# Patient Record
Sex: MALE | Race: WHITE | URBAN - METROPOLITAN AREA
[De-identification: names, ages, dates, MRNs, and addresses within clinical notes are randomized per-mention and may not be internally consistent; named-entity substitution may affect disease eponyms.]

---

## 2022-11-14 ENCOUNTER — OFFICE VISIT (OUTPATIENT)
Dept: INTERNAL MEDICINE CLINIC | Facility: CLINIC | Age: 77
End: 2022-11-14
Payer: MEDICARE

## 2022-11-14 VITALS
BODY MASS INDEX: 24.13 KG/M2 | WEIGHT: 188 LBS | HEIGHT: 74 IN | HEART RATE: 75 BPM | DIASTOLIC BLOOD PRESSURE: 82 MMHG | SYSTOLIC BLOOD PRESSURE: 130 MMHG | RESPIRATION RATE: 16 BRPM | TEMPERATURE: 98 F | OXYGEN SATURATION: 98 %

## 2022-11-14 DIAGNOSIS — Z12.11 COLON CANCER SCREENING: ICD-10-CM

## 2022-11-14 DIAGNOSIS — Z86.73 HISTORY OF STROKE: ICD-10-CM

## 2022-11-14 DIAGNOSIS — Z00.00 ENCOUNTER FOR ANNUAL HEALTH EXAMINATION: ICD-10-CM

## 2022-11-14 DIAGNOSIS — I25.119 ATHEROSCLEROSIS OF NATIVE CORONARY ARTERY OF NATIVE HEART WITH ANGINA PECTORIS (HCC): ICD-10-CM

## 2022-11-14 DIAGNOSIS — Z28.21 INFLUENZA VACCINATION DECLINED BY PATIENT: ICD-10-CM

## 2022-11-14 DIAGNOSIS — Z28.21 COVID-19 VACCINE SERIES DECLINED: ICD-10-CM

## 2022-11-14 DIAGNOSIS — E78.2 MIXED HYPERLIPIDEMIA: ICD-10-CM

## 2022-11-14 DIAGNOSIS — Z12.5 PROSTATE CANCER SCREENING: ICD-10-CM

## 2022-11-14 DIAGNOSIS — Z00.00 ANNUAL PHYSICAL EXAM: Primary | ICD-10-CM

## 2022-11-14 DIAGNOSIS — Z28.310 COVID-19 VACCINE SERIES DECLINED: ICD-10-CM

## 2022-11-14 DIAGNOSIS — I10 ESSENTIAL HYPERTENSION: ICD-10-CM

## 2022-11-14 DIAGNOSIS — Q21.12 PATENT FORAMEN OVALE: ICD-10-CM

## 2022-11-14 DIAGNOSIS — Z28.21 PNEUMOCOCCAL VACCINATION DECLINED BY PATIENT: ICD-10-CM

## 2022-11-14 PROBLEM — Q21.10 ATRIAL SEPTAL DEFECT: Status: RESOLVED | Noted: 2022-11-14 | Resolved: 2022-11-14

## 2022-11-14 PROBLEM — Q21.10 ATRIAL SEPTAL DEFECT: Status: ACTIVE | Noted: 2022-11-14

## 2022-11-14 PROBLEM — Q21.10 ATRIAL SEPTAL DEFECT (HCC): Status: RESOLVED | Noted: 2022-11-14 | Resolved: 2022-11-14

## 2022-11-14 PROBLEM — Q21.10 ATRIAL SEPTAL DEFECT (HCC): Status: ACTIVE | Noted: 2022-11-14

## 2022-11-14 RX ORDER — SACUBITRIL AND VALSARTAN 24; 26 MG/1; MG/1
1 TABLET, FILM COATED ORAL 2 TIMES DAILY
COMMUNITY
Start: 2022-11-03

## 2022-12-20 ENCOUNTER — LABORATORY ENCOUNTER (OUTPATIENT)
Dept: LAB | Age: 77
End: 2022-12-20
Attending: INTERNAL MEDICINE
Payer: MEDICARE

## 2022-12-20 DIAGNOSIS — E78.00 PURE HYPERCHOLESTEROLEMIA: ICD-10-CM

## 2022-12-20 DIAGNOSIS — Z12.5 PROSTATE CANCER SCREENING: ICD-10-CM

## 2022-12-20 DIAGNOSIS — I10 ESSENTIAL HYPERTENSION: ICD-10-CM

## 2022-12-20 DIAGNOSIS — E78.2 MIXED HYPERLIPIDEMIA: ICD-10-CM

## 2022-12-20 DIAGNOSIS — I25.10 CORONARY ARTERY DISEASE INVOLVING NATIVE HEART, UNSPECIFIED VESSEL OR LESION TYPE, UNSPECIFIED WHETHER ANGINA PRESENT: ICD-10-CM

## 2022-12-20 DIAGNOSIS — I25.5 ISCHEMIC CARDIOMYOPATHY: ICD-10-CM

## 2022-12-20 LAB
ALBUMIN SERPL-MCNC: 3.8 G/DL (ref 3.4–5)
ALBUMIN/GLOB SERPL: 1.2 {RATIO} (ref 1–2)
ALP LIVER SERPL-CCNC: 53 U/L
ALT SERPL-CCNC: 20 U/L
ANION GAP SERPL CALC-SCNC: 4 MMOL/L (ref 0–18)
AST SERPL-CCNC: 20 U/L (ref 15–37)
BASOPHILS # BLD AUTO: 0.07 X10(3) UL (ref 0–0.2)
BASOPHILS NFR BLD AUTO: 1.4 %
BILIRUB SERPL-MCNC: 0.7 MG/DL (ref 0.1–2)
BILIRUB UR QL STRIP.AUTO: NEGATIVE
BUN BLD-MCNC: 22 MG/DL (ref 7–18)
CALCIUM BLD-MCNC: 9.5 MG/DL (ref 8.5–10.1)
CHLORIDE SERPL-SCNC: 111 MMOL/L (ref 98–112)
CHOLEST SERPL-MCNC: 128 MG/DL (ref ?–200)
CLARITY UR REFRACT.AUTO: CLEAR
CO2 SERPL-SCNC: 29 MMOL/L (ref 21–32)
COLOR UR AUTO: YELLOW
COMPLEXED PSA SERPL-MCNC: 2.14 NG/ML (ref ?–4)
CREAT BLD-MCNC: 1.05 MG/DL
EOSINOPHIL # BLD AUTO: 0.17 X10(3) UL (ref 0–0.7)
EOSINOPHIL NFR BLD AUTO: 3.5 %
ERYTHROCYTE [DISTWIDTH] IN BLOOD BY AUTOMATED COUNT: 12.7 %
FASTING PATIENT LIPID ANSWER: YES
FASTING STATUS PATIENT QL REPORTED: YES
GFR SERPLBLD BASED ON 1.73 SQ M-ARVRAT: 72 ML/MIN/1.73M2 (ref 60–?)
GLOBULIN PLAS-MCNC: 3.2 G/DL (ref 2.8–4.4)
GLUCOSE BLD-MCNC: 97 MG/DL (ref 70–99)
GLUCOSE UR STRIP.AUTO-MCNC: NEGATIVE MG/DL
HCT VFR BLD AUTO: 44.8 %
HDLC SERPL-MCNC: 48 MG/DL (ref 40–59)
HGB BLD-MCNC: 14.4 G/DL
IMM GRANULOCYTES # BLD AUTO: 0.01 X10(3) UL (ref 0–1)
IMM GRANULOCYTES NFR BLD: 0.2 %
KETONES UR STRIP.AUTO-MCNC: NEGATIVE MG/DL
LDLC SERPL CALC-MCNC: 68 MG/DL (ref ?–100)
LEUKOCYTE ESTERASE UR QL STRIP.AUTO: NEGATIVE
LYMPHOCYTES # BLD AUTO: 1.03 X10(3) UL (ref 1–4)
LYMPHOCYTES NFR BLD AUTO: 21.3 %
MCH RBC QN AUTO: 31.6 PG (ref 26–34)
MCHC RBC AUTO-ENTMCNC: 32.1 G/DL (ref 31–37)
MCV RBC AUTO: 98.5 FL
MONOCYTES # BLD AUTO: 0.52 X10(3) UL (ref 0.1–1)
MONOCYTES NFR BLD AUTO: 10.8 %
NEUTROPHILS # BLD AUTO: 3.03 X10 (3) UL (ref 1.5–7.7)
NEUTROPHILS # BLD AUTO: 3.03 X10(3) UL (ref 1.5–7.7)
NEUTROPHILS NFR BLD AUTO: 62.8 %
NITRITE UR QL STRIP.AUTO: NEGATIVE
NONHDLC SERPL-MCNC: 80 MG/DL (ref ?–130)
NT-PROBNP SERPL-MCNC: 451 PG/ML (ref ?–450)
OSMOLALITY SERPL CALC.SUM OF ELEC: 301 MOSM/KG (ref 275–295)
PH UR STRIP.AUTO: 6.5 [PH] (ref 5–8)
PLATELET # BLD AUTO: 154 10(3)UL (ref 150–450)
POTASSIUM SERPL-SCNC: 5 MMOL/L (ref 3.5–5.1)
PROT SERPL-MCNC: 7 G/DL (ref 6.4–8.2)
PROT UR STRIP.AUTO-MCNC: NEGATIVE MG/DL
RBC # BLD AUTO: 4.55 X10(6)UL
RBC UR QL AUTO: NEGATIVE
SODIUM SERPL-SCNC: 144 MMOL/L (ref 136–145)
SP GR UR STRIP.AUTO: 1.02 (ref 1–1.03)
TRIGL SERPL-MCNC: 54 MG/DL (ref 30–149)
TSI SER-ACNC: 2.64 MIU/ML (ref 0.36–3.74)
UROBILINOGEN UR STRIP.AUTO-MCNC: 0.2 MG/DL
VLDLC SERPL CALC-MCNC: 8 MG/DL (ref 0–30)
WBC # BLD AUTO: 4.8 X10(3) UL (ref 4–11)

## 2022-12-20 PROCEDURE — 80061 LIPID PANEL: CPT

## 2022-12-20 PROCEDURE — 81003 URINALYSIS AUTO W/O SCOPE: CPT

## 2022-12-20 PROCEDURE — 80053 COMPREHEN METABOLIC PANEL: CPT

## 2022-12-20 PROCEDURE — 36415 COLL VENOUS BLD VENIPUNCTURE: CPT

## 2022-12-20 PROCEDURE — 84443 ASSAY THYROID STIM HORMONE: CPT

## 2022-12-20 PROCEDURE — 85025 COMPLETE CBC W/AUTO DIFF WBC: CPT

## 2022-12-20 PROCEDURE — 83880 ASSAY OF NATRIURETIC PEPTIDE: CPT

## 2022-12-28 ENCOUNTER — TELEPHONE (OUTPATIENT)
Dept: INTERNAL MEDICINE CLINIC | Facility: CLINIC | Age: 77
End: 2022-12-28

## 2023-01-04 ENCOUNTER — TELEPHONE (OUTPATIENT)
Dept: INTERNAL MEDICINE CLINIC | Facility: CLINIC | Age: 78
End: 2023-01-04

## 2023-01-04 NOTE — TELEPHONE ENCOUNTER
Patient called as he has a finger on one hand that has swollen to twice its size, is warm and is growing on angle with redness. Patient has arthritis and also had a shoulder replaced 6 years ago. Patient is wondering if this is an issue with his arthritis or if he needs to be seen by Dr. Bry Quiros. Patient would like to discuss situation with a nurse.      Please call back patient at:    [phone]

## 2023-01-04 NOTE — TELEPHONE ENCOUNTER
Spoke with pt, stated having swelling of his middle finger on right hand for few weeks, started with turning 20 degrees at knuckle and then changing color and now swelling  Advised to send pic if possible and if needed he may have to come in for evaluation  Pt v/u  Awaiting for images

## 2023-01-16 NOTE — TELEPHONE ENCOUNTER
Follow up call completed with patient. Seen at urgent care on Jan 4th, x-ray completed and patient prescribed seven day course of abx treatment. Patient has follow up appointment with orthopedic hand specialist on 23rd.

## 2023-02-09 ENCOUNTER — MED REC SCAN ONLY (OUTPATIENT)
Dept: INTERNAL MEDICINE CLINIC | Facility: CLINIC | Age: 78
End: 2023-02-09

## 2023-03-22 PROBLEM — Z12.11 COLON CANCER SCREENING: Status: ACTIVE | Noted: 2023-03-22

## 2023-07-13 ENCOUNTER — VIRTUAL PHONE E/M (OUTPATIENT)
Dept: INTERNAL MEDICINE CLINIC | Facility: CLINIC | Age: 78
End: 2023-07-13
Payer: MEDICARE

## 2023-07-13 DIAGNOSIS — I10 ESSENTIAL HYPERTENSION: Primary | ICD-10-CM

## 2023-07-13 DIAGNOSIS — I25.119 ATHEROSCLEROSIS OF NATIVE CORONARY ARTERY OF NATIVE HEART WITH ANGINA PECTORIS (HCC): ICD-10-CM

## 2023-07-13 PROCEDURE — 99441 PHONE E/M BY PHYS 5-10 MIN: CPT | Performed by: INTERNAL MEDICINE

## 2023-07-13 NOTE — PROGRESS NOTES
Subjective:   Patient ID: Gladis Rousseau is a [de-identified]year old male. Telehealth outside of [address] Verbal Consent   I conducted a telehealth visit with Gladis Rousseau today, 07/13/23, which was completed using two-way, real-time interactive audio communication. This has been done in good rayna to provide continuity of care in the best interest of the provider-patient relationship, due to the COVID -19 public health crisis/national emergency where restrictions of face-to-face office visits are ongoing. Every conscious effort was taken to allow for sufficient and adequate time to complete the visit. The patient was made aware of the limitations of the telehealth visit, including treatment limitations as no physical exam could be performed. The patient was advised to call 911 or to go to the ER in case there was an emergency. The patient was also advised of the potential privacy & security concerns related to the telehealth platform. The patient was made aware of where to find [facility] notice of privacy practices, telehealth consent form and other related consent forms and documents. which are located on the [facility] website. The patient verbally agreed to telehealth consent form, related consents and the risks discussed. Lastly, the patient confirmed that they were in [place]. Included in this visit, time may have been spent reviewing labs, medications, radiology tests and decision making. Appropriate medical decision-making and tests are ordered as detailed in the plan of care above. Coding/billing information is submitted for this visit based on complexity of care and/or time spent for the visit. Time spent 6 minutes    HPI  Gladis Rousseau is a [de-identified]year old male. HPI:   Patient presents for recheck of his hypertension. Pt has been taking medications as instructed, no medication side effects, home BP monitoring in the range of 576'Z systolic and 40'H diastolic.   Pt concerned that he does not  need entresto. His EF is normal.   Start on by [facility] cardiology consultants    Wt Readings from Last 6 Encounters:  03/22/23 : 188 lb 3.2 oz (85.4 kg)  11/14/22 : 188 lb (85.3 kg)  04/01/22 : 198 lb (89.8 kg)  03/02/22 : 197 lb (89.4 kg)  10/23/15 : 201 lb (91.2 kg)    There is no height or weight on file to calculate BMI. Lab Results   Component Value Date    CHOLEST 128 12/20/2022     Lab Results   Component Value Date    HDL 48 12/20/2022     Lab Results   Component Value Date    TRIG 54 12/20/2022     Lab Results   Component Value Date    LDL 68 12/20/2022     Lab Results   Component Value Date    AST 20 12/20/2022     Lab Results   Component Value Date    ALT 20 12/20/2022     Lab Results   Component Value Date    GLU 97 12/20/2022       Current Outpatient Medications   Medication Sig Dispense Refill    sacubitril-valsartan (ENTRESTO) 24-26 MG Oral Tab Take 1 tablet by mouth 2 (two) times daily. isosorbide mononitrate ER 30 MG Oral Tablet 24 Hr Take 1 tablet (30 mg total) by mouth daily. 90 tablet 1    carvedilol 3.125 MG Oral Tab Take 1 tablet (3.125 mg total) by mouth Q12H. 180 tablet 1    atorvastatin 40 MG Oral Tab Take 1 tablet (40 mg total) by mouth daily. clopidogrel 75 MG Oral Tab Take 1 tablet (75 mg total) by mouth daily.         Past Medical History:   Diagnosis Date    Arthritis     Atrial septal defect     Hearing loss     PFO (patent foramen ovale)     Presence of other cardiac implants and grafts     Stented coronary artery     Stroke [facility])     Wears glasses       Past Surgical History:   Procedure Laterality Date    ANGIOPLASTY (CORONARY)      CARDIAC CATHETERIZATION  02/13/2022    emergency coronary angiography    HIP ARTHROPLASTY Right     HIP REPLACEMENT SURGERY      SHOULDER ARTHROPLASTY Right       Social History:    Social History     Socioeconomic History    Marital status:    Tobacco Use    Smoking status: Former     Packs/day: 0.50     Years: 8.00 Pack years: 4.00     Types: Cigarettes    Smokeless tobacco: Never   Vaping Use    Vaping Use: Never used   Substance and Sexual Activity    Alcohol use: Yes     Alcohol/week: 4.0 standard drinks of alcohol     Types: 2 Glasses of wine, 2 Shots of liquor per week    Drug use: Not Currently         REVIEW OF SYSTEMS:   GENERAL HEALTH: feels well otherwise  SKIN: denies any unusual skin lesions or rashes  RESPIRATORY: denies shortness of breath with exertion  CARDIOVASCULAR: denies chest pain on exertion  GI: denies abdominal pain and denies heartburn  NEURO: denies headaches    EXAM:   There were no vitals taken for this visit. GENERAL: in no apparent distress  LUNGS: speaks in full sentences w/o distress      ASSESSMENT AND PLAN:   Pt presents for a recheck of his hypertension. BP is well controlled, no significant medication side effects noted. PLAN: will continue present medications, reviewed diet, exercise and weight control, defer to cardiology on cessation of entresto . The patient indicates understanding of these issues and agrees to the plan. The patient is asked to return in 6 m. History/Other:   Review of Systems  Current Outpatient Medications   Medication Sig Dispense Refill    sacubitril-valsartan (ENTRESTO) 24-26 MG Oral Tab Take 1 tablet by mouth 2 (two) times daily. isosorbide mononitrate ER 30 MG Oral Tablet 24 Hr Take 1 tablet (30 mg total) by mouth daily. 90 tablet 1    carvedilol 3.125 MG Oral Tab Take 1 tablet (3.125 mg total) by mouth Q12H. 180 tablet 1    atorvastatin 40 MG Oral Tab Take 1 tablet (40 mg total) by mouth daily. clopidogrel 75 MG Oral Tab Take 1 tablet (75 mg total) by mouth daily.        Allergies:No Known Allergies    Objective:   Physical Exam    Assessment & Plan:   Essential hypertension  (primary encounter diagnosis)  Atherosclerosis of native coronary artery of native heart with angina pectoris ([facility] [address]    No orders of the defined types were placed in this encounter.       Meds This Visit:  Requested Prescriptions      No prescriptions requested or ordered in this encounter       Imaging & Referrals:  None

## 2023-11-14 ENCOUNTER — OFFICE VISIT (OUTPATIENT)
Dept: INTERNAL MEDICINE CLINIC | Facility: CLINIC | Age: 78
End: 2023-11-14
Payer: MEDICARE

## 2023-11-14 VITALS
TEMPERATURE: 98 F | BODY MASS INDEX: 23.61 KG/M2 | HEIGHT: 74 IN | HEART RATE: 58 BPM | OXYGEN SATURATION: 98 % | DIASTOLIC BLOOD PRESSURE: 70 MMHG | SYSTOLIC BLOOD PRESSURE: 120 MMHG | WEIGHT: 184 LBS | RESPIRATION RATE: 16 BRPM

## 2023-11-14 DIAGNOSIS — Z28.21 INFLUENZA VACCINATION DECLINED BY PATIENT: ICD-10-CM

## 2023-11-14 DIAGNOSIS — Z86.73 HISTORY OF STROKE: ICD-10-CM

## 2023-11-14 DIAGNOSIS — Q21.12 PATENT FORAMEN OVALE: ICD-10-CM

## 2023-11-14 DIAGNOSIS — Z00.00 ENCOUNTER FOR ANNUAL HEALTH EXAMINATION: ICD-10-CM

## 2023-11-14 DIAGNOSIS — Z28.310 COVID-19 VACCINE SERIES DECLINED: ICD-10-CM

## 2023-11-14 DIAGNOSIS — Z28.21 COVID-19 VACCINE SERIES DECLINED: ICD-10-CM

## 2023-11-14 DIAGNOSIS — I77.819 AORTIC ECTASIA, UNSPECIFIED SITE (HCC): ICD-10-CM

## 2023-11-14 DIAGNOSIS — I25.119 ATHEROSCLEROSIS OF NATIVE CORONARY ARTERY OF NATIVE HEART WITH ANGINA PECTORIS (HCC): ICD-10-CM

## 2023-11-14 DIAGNOSIS — Z00.00 ANNUAL PHYSICAL EXAM: Primary | ICD-10-CM

## 2023-11-14 DIAGNOSIS — Z23 NEED FOR PNEUMOCOCCAL VACCINATION: ICD-10-CM

## 2023-11-14 DIAGNOSIS — Z12.5 PROSTATE CANCER SCREENING: ICD-10-CM

## 2023-11-14 DIAGNOSIS — E78.2 MIXED HYPERLIPIDEMIA: ICD-10-CM

## 2023-11-14 PROBLEM — I10 ESSENTIAL HYPERTENSION: Status: RESOLVED | Noted: 2023-07-13 | Resolved: 2023-11-14

## 2023-11-14 PROBLEM — Z12.11 COLON CANCER SCREENING: Status: RESOLVED | Noted: 2023-03-22 | Resolved: 2023-11-14

## 2023-11-14 PROCEDURE — G0439 PPPS, SUBSEQ VISIT: HCPCS | Performed by: INTERNAL MEDICINE

## 2023-11-14 RX ORDER — CHOLECALCIFEROL (VITAMIN D3) 1250 MCG
CAPSULE ORAL
COMMUNITY
Start: 2023-10-30

## 2023-11-29 LAB
AMB EXT BUN: 20 MG/DL
AMB EXT CALCIUM: 9.1
AMB EXT CARBON DIOXIDE: 22
AMB EXT CHLORIDE: 102
AMB EXT CREATININE: 1.03 MG/DL
AMB EXT GLUCOSE: 90 MG/DL
AMB EXT POSTASSIUM: 4.6 MMOL/L
AMB EXT PSA SCREEN: 2.4 NG/ML
AMB EXT SODIUM: 142 MMOL/L

## 2024-01-19 ENCOUNTER — OFFICE VISIT (OUTPATIENT)
Dept: INTERNAL MEDICINE CLINIC | Facility: CLINIC | Age: 79
End: 2024-01-19
Payer: MEDICARE

## 2024-01-19 VITALS
OXYGEN SATURATION: 98 % | DIASTOLIC BLOOD PRESSURE: 72 MMHG | HEIGHT: 74 IN | RESPIRATION RATE: 16 BRPM | TEMPERATURE: 97 F | BODY MASS INDEX: 23.74 KG/M2 | HEART RATE: 52 BPM | WEIGHT: 185 LBS | SYSTOLIC BLOOD PRESSURE: 126 MMHG

## 2024-01-19 DIAGNOSIS — I25.10 CORONARY ARTERY DISEASE INVOLVING NATIVE CORONARY ARTERY OF NATIVE HEART WITHOUT ANGINA PECTORIS: ICD-10-CM

## 2024-01-19 DIAGNOSIS — H25.9 AGE-RELATED CATARACT OF BOTH EYES, UNSPECIFIED AGE-RELATED CATARACT TYPE: ICD-10-CM

## 2024-01-19 DIAGNOSIS — Z00.00 ROUTINE PHYSICAL EXAMINATION: Primary | ICD-10-CM

## 2024-01-19 PROCEDURE — 99214 OFFICE O/P EST MOD 30 MIN: CPT | Performed by: PHYSICIAN ASSISTANT

## 2024-01-19 NOTE — H&P
Sid Phillip is a 78 year old year old male who presents for a pre-operative physical exam.  Patient is to have cataract surgery, right then left eye, to be done by Dr. Bianchi at [facility] on 2/1/24 and 2/16/24.    HPI:    Patient is electing to undergo cataract surgery due to progressive worsening of vision.   Functional capacity: the patient denies any chest pain or shortness of breath on exertion.  States he is able to walk up 2 flights of stairs carrying heavy groceries without difficulty, shortness of breath, or chest pain.  On review of his chart, he has had no recent coronary evaluation.  He did have an angiogram in 2022 with stent placed. He had a stress test on 2/7/23 which showed no ischemia.  Today, he has no complaints.  Medications:   Current Outpatient Medications:   •  Cholecalciferol (VITAMIN D3) 1.25 MG (04472 UT) Oral Cap, Take 1 capsule by mouth once a week after 8 weeks are over take OTC Vit D 2,000 units once a day., Disp: , Rfl:   •  sacubitril-valsartan (ENTRESTO) 24-26 MG Oral Tab, Take 1 tablet by mouth 2 (two) times daily., Disp: , Rfl:   •  isosorbide mononitrate ER 30 MG Oral Tablet 24 Hr, Take 1 tablet (30 mg total) by mouth daily., Disp: 90 tablet, Rfl: 1  •  carvedilol 3.125 MG Oral Tab, Take 1 tablet (3.125 mg total) by mouth Q12H., Disp: 180 tablet, Rfl: 1  •  atorvastatin 40 MG Oral Tab, Take 1 tablet (40 mg total) by mouth daily., Disp: , Rfl:   •  clopidogrel 75 MG Oral Tab, Take 1 tablet (75 mg total) by mouth daily., Disp: , Rfl:   Allergies: No Known Allergies  PMH:  has a past medical history of Arthritis, Atrial septal defect, Hearing loss, PFO (patent foramen ovale), Presence of other cardiac implants and grafts, Stented coronary artery, Stroke (HCC), and Wears glasses.  Surgical Hx:  has a past surgical history that includes Cardiac catheterization (02/13/2022); Hip Arthroplasty (Right); shoulder arthroplasty (Right); angioplasty (coronary); and hip  replacement surgery.  Family Hx: family history is not on file.  Social Hx:   Social History     Socioeconomic History   • Marital status:    Tobacco Use   • Smoking status: Former     Packs/day: 0.50     Years: 8.00     Additional pack years: 0.00     Total pack years: 4.00     Types: Cigarettes   • Smokeless tobacco: Never   Vaping Use   • Vaping Use: Never used   Substance and Sexual Activity   • Alcohol use: Yes     Alcohol/week: 4.0 standard drinks of alcohol     Types: 2 Glasses of wine, 2 Shots of liquor per week   • Drug use: Not Currently      REVIEW OF SYSTEMS:    GENERAL: feels well otherwise  SKIN: denies any unusual skin lesions  EYES:denies blurred vision or double vision  HEENT: denies nasal congestion, sinus pain or sore throat  LUNGS: denies shortness of breath with exertion. Denies history of sleep apnea or COPD  CARDIOVASCULAR: denies chest pain on exertion  GI: denies abdominal pain, denies heartburn  : denies dysuria, hematuria or flank pain  MUSCULOSKELETAL: denies back pain  NEURO: denies headaches, dizziness, fainting  PSYCH: denies depression or anxiety  HEMATOLOGIC: denies hx of anemia, bleeding or clotting disorders  ENDOCRINE: denies thyroid history or diabetes  ALL/ASTHMA: denies hx of allergy or asthma. Denies hx of adverse reaction to anesthesia or analgesics.  EXAM:    /72   Pulse (!) 41   Temp 97.2 °F (36.2 °C)   Resp 16   Ht 6' 2\" (1.88 m)   Wt 185 lb (83.9 kg)   SpO2 98%   BMI 23.75 kg/m²   GENERAL: well developed, well nourished, in no apparent distress  SKIN: no rashes, no suspicious lesions.  Warm and dry.  HEENT: atraumatic, normocephalic, ears and throat are clear  EYES: PERRLA, EOMI, normal optic disk, conjunctiva are clear  NECK: supple, no adenopathy, no bruits b/l  CHEST: no chest tenderness  LUNGS: clear to auscultation b/l  CARDIO: RRR without murmur  GI: good BS's, no masses, HSM or tenderness  MUSCULOSKELETAL: No CVA tenderness, FROM of the  back  EXTREMITIES: no cyanosis, clubbing or edema  NEURO: Oriented times three, cranial nerves are intact, motor and sensory are grossly intact. DTRs +2 and symmetric b/l.    ASSESSMENT AND PLAN:    Sid Phillip is a 78 year old male who presents for cardiac risk assessment prior to his cataract surgery, right then left eye, to be done by Dr. Bianchi at [facility] on 2/1/24 and 2/16/24.    Pt has the following conditions:   Patient Active Problem List   Diagnosis   • Patent foramen ovale   • Mixed hyperlipidemia   • Atherosclerotic heart disease of native coronary artery with unspecified angina pectoris (HCC)-s/p anterior wall MI 2/2022 s/p LAD stent   • Influenza vaccination declined by patient   • History of stroke   • COVID-19 vaccine series declined   • Aortic ectasia, unspecified site (HCC)      Pt has no significant history of pulmonary conditions. He has history of heart disease without any recent chest pain or shortness of breath.    The patient has a perioperative risk of major adverse cardiac event that is less than 1%.  He has an exercise capacity of greater than 4 metabolic units.   Therefore according to ACC/AHA guidelines, he is cleared for surgery with low cardiovascular risk.  The patient is aware he can continue clopidogrel as usual in the perioperative period.    This consult was sent back to the referring physician, Dr. Bianchi.

## 2024-04-08 ENCOUNTER — MED REC SCAN ONLY (OUTPATIENT)
Dept: INTERNAL MEDICINE CLINIC | Facility: CLINIC | Age: 79
End: 2024-04-08

## 2024-10-31 ENCOUNTER — HOSPITAL ENCOUNTER (OUTPATIENT)
Dept: GENERAL RADIOLOGY | Age: 79
Discharge: HOME | End: 2024-10-31
Attending: INTERNAL MEDICINE
Payer: MEDICARE

## 2024-10-31 ENCOUNTER — OFFICE VISIT (OUTPATIENT)
Dept: INTERNAL MEDICINE CLINIC | Facility: CLINIC | Age: 79
End: 2024-10-31
Payer: MEDICARE

## 2024-10-31 ENCOUNTER — LAB ENCOUNTER (OUTPATIENT)
Dept: LAB | Age: 79
End: 2024-10-31
Attending: INTERNAL MEDICINE
Payer: MEDICARE

## 2024-10-31 VITALS
HEART RATE: 56 BPM | WEIGHT: 186 LBS | TEMPERATURE: 98 F | HEIGHT: 74 IN | BODY MASS INDEX: 23.87 KG/M2 | DIASTOLIC BLOOD PRESSURE: 72 MMHG | SYSTOLIC BLOOD PRESSURE: 126 MMHG | RESPIRATION RATE: 16 BRPM | OXYGEN SATURATION: 98 %

## 2024-10-31 DIAGNOSIS — Z01.810 PREOP CARDIOVASCULAR EXAM: ICD-10-CM

## 2024-10-31 DIAGNOSIS — M16.12 PRIMARY OSTEOARTHRITIS OF LEFT HIP: ICD-10-CM

## 2024-10-31 DIAGNOSIS — Z12.5 PROSTATE CANCER SCREENING: ICD-10-CM

## 2024-10-31 DIAGNOSIS — I25.119 ATHEROSCLEROSIS OF NATIVE CORONARY ARTERY OF NATIVE HEART WITH ANGINA PECTORIS (HCC): ICD-10-CM

## 2024-10-31 DIAGNOSIS — Z01.810 PREOP CARDIOVASCULAR EXAM: Primary | ICD-10-CM

## 2024-10-31 DIAGNOSIS — I10 ESSENTIAL HYPERTENSION: ICD-10-CM

## 2024-10-31 PROBLEM — I77.819 ECTATIC AORTA (HCC): Status: ACTIVE | Noted: 2023-11-14

## 2024-10-31 PROBLEM — I77.819 ECTATIC AORTA: Status: ACTIVE | Noted: 2023-11-14

## 2024-10-31 LAB
ALBUMIN SERPL-MCNC: 4.1 G/DL (ref 3.2–4.8)
ALBUMIN/GLOB SERPL: 1.5 {RATIO} (ref 1–2)
ALP LIVER SERPL-CCNC: 52 U/L
ALT SERPL-CCNC: 15 U/L
ANION GAP SERPL CALC-SCNC: 4 MMOL/L (ref 0–18)
AST SERPL-CCNC: 22 U/L (ref ?–34)
BASOPHILS # BLD AUTO: 0.06 X10(3) UL (ref 0–0.2)
BASOPHILS NFR BLD AUTO: 1.1 %
BILIRUB SERPL-MCNC: 0.5 MG/DL (ref 0.2–1.1)
BILIRUB UR QL STRIP.AUTO: NEGATIVE
BUN BLD-MCNC: 21 MG/DL (ref 9–23)
CALCIUM BLD-MCNC: 9.6 MG/DL (ref 8.7–10.4)
CHLORIDE SERPL-SCNC: 106 MMOL/L (ref 98–112)
CLARITY UR REFRACT.AUTO: CLEAR
CO2 SERPL-SCNC: 29 MMOL/L (ref 21–32)
COMPLEXED PSA SERPL-MCNC: 1.82 NG/ML (ref ?–4)
CREAT BLD-MCNC: 1.07 MG/DL
EGFRCR SERPLBLD CKD-EPI 2021: 70 ML/MIN/1.73M2 (ref 60–?)
EOSINOPHIL # BLD AUTO: 0.13 X10(3) UL (ref 0–0.7)
EOSINOPHIL NFR BLD AUTO: 2.3 %
ERYTHROCYTE [DISTWIDTH] IN BLOOD BY AUTOMATED COUNT: 12.9 %
FASTING STATUS PATIENT QL REPORTED: YES
GLOBULIN PLAS-MCNC: 2.8 G/DL (ref 2–3.5)
GLUCOSE BLD-MCNC: 108 MG/DL (ref 70–99)
GLUCOSE UR STRIP.AUTO-MCNC: NORMAL MG/DL
HCT VFR BLD AUTO: 39 %
HGB BLD-MCNC: 12.9 G/DL
IMM GRANULOCYTES # BLD AUTO: 0.02 X10(3) UL (ref 0–1)
IMM GRANULOCYTES NFR BLD: 0.4 %
INR BLD: 1.05 (ref 0.8–1.2)
KETONES UR STRIP.AUTO-MCNC: NEGATIVE MG/DL
LEUKOCYTE ESTERASE UR QL STRIP.AUTO: NEGATIVE
LYMPHOCYTES # BLD AUTO: 1.45 X10(3) UL (ref 1–4)
LYMPHOCYTES NFR BLD AUTO: 25.8 %
MCH RBC QN AUTO: 31.6 PG (ref 26–34)
MCHC RBC AUTO-ENTMCNC: 33.1 G/DL (ref 31–37)
MCV RBC AUTO: 95.6 FL
MONOCYTES # BLD AUTO: 0.54 X10(3) UL (ref 0.1–1)
MONOCYTES NFR BLD AUTO: 9.6 %
NEUTROPHILS # BLD AUTO: 3.43 X10 (3) UL (ref 1.5–7.7)
NEUTROPHILS # BLD AUTO: 3.43 X10(3) UL (ref 1.5–7.7)
NEUTROPHILS NFR BLD AUTO: 60.8 %
NITRITE UR QL STRIP.AUTO: NEGATIVE
OSMOLALITY SERPL CALC.SUM OF ELEC: 292 MOSM/KG (ref 275–295)
PH UR STRIP.AUTO: 5 [PH] (ref 5–8)
PLATELET # BLD AUTO: 177 10(3)UL (ref 150–450)
POTASSIUM SERPL-SCNC: 4.1 MMOL/L (ref 3.5–5.1)
PROT SERPL-MCNC: 6.9 G/DL (ref 5.7–8.2)
PROT UR STRIP.AUTO-MCNC: NEGATIVE MG/DL
PROTHROMBIN TIME: 13.8 SECONDS (ref 11.6–14.8)
RBC # BLD AUTO: 4.08 X10(6)UL
RBC UR QL AUTO: NEGATIVE
SODIUM SERPL-SCNC: 139 MMOL/L (ref 136–145)
SP GR UR STRIP.AUTO: 1.02 (ref 1–1.03)
UROBILINOGEN UR STRIP.AUTO-MCNC: NORMAL MG/DL
WBC # BLD AUTO: 5.6 X10(3) UL (ref 4–11)

## 2024-10-31 PROCEDURE — 71046 X-RAY EXAM CHEST 2 VIEWS: CPT | Performed by: INTERNAL MEDICINE

## 2024-10-31 PROCEDURE — 85610 PROTHROMBIN TIME: CPT | Performed by: INTERNAL MEDICINE

## 2024-10-31 PROCEDURE — 85025 COMPLETE CBC W/AUTO DIFF WBC: CPT | Performed by: INTERNAL MEDICINE

## 2024-10-31 PROCEDURE — 87081 CULTURE SCREEN ONLY: CPT

## 2024-10-31 PROCEDURE — 81003 URINALYSIS AUTO W/O SCOPE: CPT | Performed by: INTERNAL MEDICINE

## 2024-10-31 PROCEDURE — 99214 OFFICE O/P EST MOD 30 MIN: CPT | Performed by: INTERNAL MEDICINE

## 2024-10-31 PROCEDURE — 80053 COMPREHEN METABOLIC PANEL: CPT | Performed by: INTERNAL MEDICINE

## 2024-10-31 PROCEDURE — 36415 COLL VENOUS BLD VENIPUNCTURE: CPT | Performed by: INTERNAL MEDICINE

## 2024-10-31 NOTE — H&P
[address]    History and Physical    Sid Phillip Patient Status:  No patient class for patient encounter    1945 MRN [id number]   Location [address] Attending No att. providers found   Hosp Day # 0 PCP Chucho Shoemaker MD     Date:  10/31/2024    HPI:     Chief Complaint   Patient presents with    Pre-Op     Pre-op for Cystoscopy, right ureteral stent placement 24 with Dr. Hassan.     HPI  Sid Phillip is a 79 year old male with a past medical history of CAD, ectatic aorta, HTN, Hyperlipemia, and PFO who presents for cardiac risk assessment and a pre-operative physical exam. Patient is to have left hip replacement, to be done by Dr. Dandre Hassan on 2024.      HPI:   Pt reports normal state of health. He denies chest pain or sob. He is able to exercise for over 30 minutes at a moderate intensity without experiencing cardiac symptoms.    He reports that he recently underwent coronary angiography with his cardiologist (Dr Guerrero) and the results we favorable. I do not have the results as it ws performed at an outside hospital not on [facility].    He does reports worsening left hip pain that has failed conservative therapy    PAST MEDICAL, SOCIAL, FAMILY HISTORIES REVIEWED WITH PT          Current Outpatient Medications   Medication Sig Dispense Refill    Cholecalciferol (VITAMIN D3) 1.25 MG (38286 UT) Oral Cap Take 1 capsule by mouth once a week after 8 weeks are over take OTC Vit D 2,000 units once a day.      sacubitril-valsartan (ENTRESTO) 24-26 MG Oral Tab Take 1 tablet by mouth 2 (two) times daily.      isosorbide mononitrate ER 30 MG Oral Tablet 24 Hr Take 1 tablet (30 mg total) by mouth daily. 90 tablet 1    carvedilol 3.125 MG Oral Tab Take 1 tablet (3.125 mg total) by mouth Q12H. 180 tablet 1    atorvastatin 40 MG Oral Tab Take 1 tablet (40 mg total) by mouth daily.      clopidogrel 75 MG  Oral Tab Take 1 tablet (75 mg total) by mouth daily.        Allergies: Allergies[1]   Past Medical History:    Arthritis    Atrial septal defect (HCC)    Hearing loss    PFO (patent foramen ovale) (HCC)    Presence of other cardiac implants and grafts    Stented coronary artery    Stroke (HCC)    Wears glasses      Past Surgical History:   Procedure Laterality Date    Angioplasty (coronary)  02/2022    with stent, heart attack    Cardiac catheterization  02/13/2022    emergency coronary angiography    Hip arthroplasty Right     Hip replacement surgery  2017    Shoulder arthroplasty Right       History reviewed. No pertinent family history.   Social History:   Social History     Socioeconomic History    Marital status:    Tobacco Use    Smoking status: Former     Current packs/day: 0.50     Average packs/day: 0.5 packs/day for 8.0 years (4.0 ttl pk-yrs)     Types: Cigarettes    Smokeless tobacco: Never   Vaping Use    Vaping status: Never Used   Substance and Sexual Activity    Alcohol use: Yes     Alcohol/week: 4.0 standard drinks of alcohol     Types: 2 Glasses of wine, 2 Shots of liquor per week    Drug use: Not Currently        Exercise:  twice per week, three times per week.  Diet: watches fats closely and watches sugar closely     REVIEW OF SYSTEMS:   GENERAL: feels well otherwise  SKIN: denies any unusual skin lesions  EYES:denies blurred vision or double vision  HEENT: denies nasal congestion, sinus pain or ST  LUNGS: denies shortness of breath with exertion  CARDIOVASCULAR: denies chest pain on exertion  GI: denies abdominal pain,denies heartburn  : denies dysuria, vaginal discharge or itching,periods regular denies nocturia or changes in stream  MUSCULOSKELETAL: denies back pain  NEURO: denies headaches  PSYCHE: denies depression or anxiety  HEMATOLOGIC: denies hx of anemia  ENDOCRINE: denies thyroid history  ALL/ASTHMA: denies hx of allergy or asthma    EXAM:   /72   Pulse 56   Temp 97.9  °F (36.6 °C)   Resp 16   Ht 6' 2\" (1.88 m)   Wt 186 lb (84.4 kg)   SpO2 98%   BMI 23.88 kg/m²   GENERAL: well developed, well nourished,in no apparent distress  SKIN: no rashes,no suspicious lesions  HEENT: atraumatic, normocephalic,ears and throat are clear  EYES:PERRLA, EOMI, normal optic disk,conjunctiva are clear  NECK: supple,no adenopathy,no bruits  CHEST: no chest tenderness  BREAST: no dominant or suspicious mass  LUNGS: clear to auscultation  CARDIO: RRR without murmur  GI: good BS's,no masses, HSM or tenderness  : deferred  RECTAL: good rectal tone, prostate shows no masses, stool is OB negative  MUSCULOSKELETAL: back is not tender,FROM of the back  EXTREMITIES: no cyanosis, clubbing or edema  NEURO: Oriented times three,cranial nerves are intact,motor and sensory are grossly intact    ASSESSMENT AND PLAN:   Sid Phillip is a 79 year old male with a past medical history of CAD, ectatic aorta, HTN, Hyperlipemia, and PFO who presents for cardiac risk assessment and a pre-operative physical exam. Patient is to have left hip replacement, to be done by Dr. Dandre Hassan on 11/21/2024.      According to the ACC/AHA guidelines, the patient does not have a history of acute coronary syndrome. His estimated perioperative risk for major adverse cardiac event is low based on combined clinical and surgical risks.  He is therefore cleared with low cardiac risk.  He will not require further cardiovascular testing.    Pt has been instructed to hold plavix 5 days prior to his surgery.    Preoperative labs and chest xray have been ordered by request of the surgeon.    The pt reports he has a recent EKG performed by his cardiologist. I do not have access to this result.    History     Past Medical History:    Arthritis    Atrial septal defect (HCC)    Hearing loss    PFO (patent foramen ovale) (HCC)    Presence of other cardiac implants and grafts    Stented coronary artery    Stroke (HCC)    Wears glasses      Past Surgical History:   Procedure Laterality Date    Angioplasty (coronary)  02/2022    with stent, heart attack    Cardiac catheterization  02/13/2022    emergency coronary angiography    Hip arthroplasty Right     Hip replacement surgery  2017    Shoulder arthroplasty Right      History reviewed. No pertinent family history.  Social History:  Social History     Socioeconomic History    Marital status:    Tobacco Use    Smoking status: Former     Current packs/day: 0.50     Average packs/day: 0.5 packs/day for 8.0 years (4.0 ttl pk-yrs)     Types: Cigarettes    Smokeless tobacco: Never   Vaping Use    Vaping status: Never Used   Substance and Sexual Activity    Alcohol use: Yes     Alcohol/week: 4.0 standard drinks of alcohol     Types: 2 Glasses of wine, 2 Shots of liquor per week    Drug use: Not Currently     Allergies/Medications:   Allergies: Allergies[2]  (Not in a hospital admission)        Physical Exam:   Vital Signs:  Blood pressure 126/72, pulse 56, temperature 97.9 °F (36.6 °C), resp. rate 16, height 6' 2\" (1.88 m), weight 186 lb (84.4 kg), SpO2 98%.        Results:     Lab Results   Component Value Date    WBC 4.8 12/20/2022    HGB 14.4 12/20/2022    HCT 44.8 12/20/2022    .0 12/20/2022    CREATSERUM 1.03 11/29/2023    BUN 20 11/29/2023     12/20/2022    K 4.6 11/29/2023     11/29/2023    CO2 22 11/29/2023    GLU 90 11/29/2023    CA 9.1 11/29/2023    ALB 3.8 12/20/2022    ALKPHO 53 12/20/2022    BILT 0.7 12/20/2022    TP 7.0 12/20/2022    AST 20 12/20/2022    ALT 20 12/20/2022    TSH 2.640 12/20/2022     XR CHEST PA + LAT CHEST (CPT=71046)    Result Date: 10/31/2024  CONCLUSION:  Hyperinflation of the lungs suggesting the possibility of underlying COPD and/or asthma. Clinical correlation recommended.   LOCATION:  [place]   Dictated by (CST): Terrell Cummings MD on 10/31/2024 at 12:42 PM     Finalized by (CST): Terrell Cummings MD on 10/31/2024 at 12:43 PM                   Chucho Shoemaker MD  10/31/2024         [1] No Known Allergies  [2] No Known Allergies

## 2024-11-05 ENCOUNTER — TELEPHONE (OUTPATIENT)
Dept: INTERNAL MEDICINE CLINIC | Facility: CLINIC | Age: 79
End: 2024-11-05

## 2024-11-05 NOTE — TELEPHONE ENCOUNTER
Patient contacting office to discuss recent test results. Informed Dr. Shoemaker will review in further detail at upcoming Dec appt. Patient advised RN although lab documentation states he was fasting during his lab draw he was in fact not. Discussed repeat metabolic panel and also lipid panel fasting at time of upcoming appt.

## 2024-11-19 ENCOUNTER — TELEPHONE (OUTPATIENT)
Dept: INTERNAL MEDICINE CLINIC | Facility: CLINIC | Age: 79
End: 2024-11-19

## 2024-11-19 DIAGNOSIS — E78.2 MIXED HYPERLIPIDEMIA: Primary | ICD-10-CM

## 2024-11-19 NOTE — TELEPHONE ENCOUNTER
Lipid Order  [facility] Lab Order     Patient went for annual labs but did not fast.    Patient needs this order re-sent so he can get it drawn soon.    Next OV 1/7/25

## 2025-03-26 ENCOUNTER — TELEPHONE (OUTPATIENT)
Dept: INTERNAL MEDICINE CLINIC | Facility: CLINIC | Age: 80
End: 2025-03-26

## 2025-03-26 DIAGNOSIS — E78.2 MIXED HYPERLIPIDEMIA: ICD-10-CM

## 2025-03-26 DIAGNOSIS — Z01.810 PREOP CARDIOVASCULAR EXAM: ICD-10-CM

## 2025-03-26 DIAGNOSIS — Z12.5 PROSTATE CANCER SCREENING: ICD-10-CM

## 2025-03-26 DIAGNOSIS — I10 ESSENTIAL HYPERTENSION: ICD-10-CM

## 2025-05-16 ENCOUNTER — LAB ENCOUNTER (OUTPATIENT)
Dept: LAB | Age: 80
End: 2025-05-16
Attending: INTERNAL MEDICINE
Payer: MEDICARE

## 2025-05-16 DIAGNOSIS — Z01.810 PREOP CARDIOVASCULAR EXAM: ICD-10-CM

## 2025-05-16 DIAGNOSIS — I10 ESSENTIAL HYPERTENSION: ICD-10-CM

## 2025-05-16 DIAGNOSIS — E78.2 MIXED HYPERLIPIDEMIA: ICD-10-CM

## 2025-05-16 LAB
ALBUMIN SERPL-MCNC: 4.6 G/DL (ref 3.2–4.8)
ALBUMIN/GLOB SERPL: 1.8 {RATIO} (ref 1–2)
ALP LIVER SERPL-CCNC: 49 U/L (ref 45–117)
ALT SERPL-CCNC: 11 U/L (ref 10–49)
ANION GAP SERPL CALC-SCNC: 2 MMOL/L (ref 0–18)
AST SERPL-CCNC: 23 U/L (ref ?–34)
BASOPHILS # BLD AUTO: 0.05 X10(3) UL (ref 0–0.2)
BASOPHILS NFR BLD AUTO: 0.9 %
BILIRUB SERPL-MCNC: 0.8 MG/DL (ref 0.2–1.1)
BILIRUB UR QL STRIP.AUTO: NEGATIVE
BUN BLD-MCNC: 22 MG/DL (ref 9–23)
CALCIUM BLD-MCNC: 9.6 MG/DL (ref 8.7–10.6)
CHLORIDE SERPL-SCNC: 108 MMOL/L (ref 98–112)
CHOLEST SERPL-MCNC: 117 MG/DL (ref ?–200)
CLARITY UR REFRACT.AUTO: CLEAR
CO2 SERPL-SCNC: 30 MMOL/L (ref 21–32)
CREAT BLD-MCNC: 1.16 MG/DL (ref 0.7–1.3)
EGFRCR SERPLBLD CKD-EPI 2021: 63 ML/MIN/1.73M2 (ref 60–?)
EOSINOPHIL # BLD AUTO: 0.14 X10(3) UL (ref 0–0.7)
EOSINOPHIL NFR BLD AUTO: 2.5 %
ERYTHROCYTE [DISTWIDTH] IN BLOOD BY AUTOMATED COUNT: 13.4 %
FASTING PATIENT LIPID ANSWER: YES
FASTING STATUS PATIENT QL REPORTED: YES
GLOBULIN PLAS-MCNC: 2.6 G/DL (ref 2–3.5)
GLUCOSE BLD-MCNC: 101 MG/DL (ref 70–99)
GLUCOSE UR STRIP.AUTO-MCNC: NORMAL MG/DL
HCT VFR BLD AUTO: 42 % (ref 39–53)
HDLC SERPL-MCNC: 38 MG/DL (ref 40–59)
HGB BLD-MCNC: 13.6 G/DL (ref 13–17.5)
IMM GRANULOCYTES # BLD AUTO: 0.01 X10(3) UL (ref 0–1)
IMM GRANULOCYTES NFR BLD: 0.2 %
KETONES UR STRIP.AUTO-MCNC: NEGATIVE MG/DL
LDLC SERPL CALC-MCNC: 65 MG/DL (ref ?–100)
LEUKOCYTE ESTERASE UR QL STRIP.AUTO: NEGATIVE
LYMPHOCYTES # BLD AUTO: 1.17 X10(3) UL (ref 1–4)
LYMPHOCYTES NFR BLD AUTO: 20.6 %
MCH RBC QN AUTO: 31 PG (ref 26–34)
MCHC RBC AUTO-ENTMCNC: 32.4 G/DL (ref 31–37)
MCV RBC AUTO: 95.7 FL (ref 80–100)
MONOCYTES # BLD AUTO: 0.58 X10(3) UL (ref 0.1–1)
MONOCYTES NFR BLD AUTO: 10.2 %
NEUTROPHILS # BLD AUTO: 3.73 X10 (3) UL (ref 1.5–7.7)
NEUTROPHILS # BLD AUTO: 3.73 X10(3) UL (ref 1.5–7.7)
NEUTROPHILS NFR BLD AUTO: 65.6 %
NITRITE UR QL STRIP.AUTO: NEGATIVE
NONHDLC SERPL-MCNC: 79 MG/DL (ref ?–130)
OSMOLALITY SERPL CALC.SUM OF ELEC: 293 MOSM/KG (ref 275–295)
PH UR STRIP.AUTO: 6.5 [PH] (ref 5–8)
PLATELET # BLD AUTO: 182 10(3)UL (ref 150–450)
POTASSIUM SERPL-SCNC: 4.7 MMOL/L (ref 3.5–5.1)
PROT SERPL-MCNC: 7.2 G/DL (ref 5.7–8.2)
PROT UR STRIP.AUTO-MCNC: NEGATIVE MG/DL
RBC # BLD AUTO: 4.39 X10(6)UL (ref 3.8–5.8)
RBC UR QL AUTO: NEGATIVE
SODIUM SERPL-SCNC: 140 MMOL/L (ref 136–145)
SP GR UR STRIP.AUTO: 1.02 (ref 1–1.03)
TRIGL SERPL-MCNC: 65 MG/DL (ref 30–149)
TSI SER-ACNC: 2.02 UIU/ML (ref 0.55–4.78)
UROBILINOGEN UR STRIP.AUTO-MCNC: NORMAL MG/DL
VLDLC SERPL CALC-MCNC: 10 MG/DL (ref 0–30)
WBC # BLD AUTO: 5.7 X10(3) UL (ref 4–11)

## 2025-05-16 PROCEDURE — 36415 COLL VENOUS BLD VENIPUNCTURE: CPT

## 2025-05-16 PROCEDURE — 81003 URINALYSIS AUTO W/O SCOPE: CPT

## 2025-05-16 PROCEDURE — 80061 LIPID PANEL: CPT

## 2025-05-16 PROCEDURE — 85025 COMPLETE CBC W/AUTO DIFF WBC: CPT

## 2025-05-16 PROCEDURE — 80053 COMPREHEN METABOLIC PANEL: CPT

## 2025-05-16 PROCEDURE — 84443 ASSAY THYROID STIM HORMONE: CPT

## 2025-05-22 ENCOUNTER — OFFICE VISIT (OUTPATIENT)
Dept: INTERNAL MEDICINE CLINIC | Facility: CLINIC | Age: 80
End: 2025-05-22
Payer: MEDICARE

## 2025-05-22 VITALS
HEIGHT: 74 IN | WEIGHT: 189 LBS | OXYGEN SATURATION: 98 % | SYSTOLIC BLOOD PRESSURE: 122 MMHG | BODY MASS INDEX: 24.26 KG/M2 | RESPIRATION RATE: 16 BRPM | DIASTOLIC BLOOD PRESSURE: 72 MMHG | TEMPERATURE: 98 F | HEART RATE: 80 BPM

## 2025-05-22 DIAGNOSIS — Z00.00 ENCOUNTER FOR ANNUAL HEALTH EXAMINATION: ICD-10-CM

## 2025-05-22 DIAGNOSIS — I10 ESSENTIAL HYPERTENSION: ICD-10-CM

## 2025-05-22 DIAGNOSIS — E78.2 MIXED HYPERLIPIDEMIA: ICD-10-CM

## 2025-05-22 DIAGNOSIS — Z00.00 ANNUAL PHYSICAL EXAM: Primary | ICD-10-CM

## 2025-05-22 DIAGNOSIS — Q21.12 PATENT FORAMEN OVALE (HCC): ICD-10-CM

## 2025-05-22 DIAGNOSIS — Z86.73 HISTORY OF STROKE: ICD-10-CM

## 2025-05-22 DIAGNOSIS — I25.119 ATHEROSCLEROSIS OF NATIVE CORONARY ARTERY OF NATIVE HEART WITH ANGINA PECTORIS: ICD-10-CM

## 2025-05-22 DIAGNOSIS — I77.819 ECTATIC AORTA: ICD-10-CM

## 2025-05-22 NOTE — PROGRESS NOTES
Subjective:   Sid Phillip is a 79 year old male who presents for a Subsequent Annual Wellness visit (Pt already had Initial Annual Wellness) and scheduled follow up of multiple significant but stable problems.             HPI  Normal state of health  Feels great    PAST MEDICAL, SOCIAL, FAMILY HISTORIES REVIEWED WITH PT    History/Other:   Fall Risk Assessment:   He has been screened for Falls and is low risk.      Cognitive Assessment:   Abnormal  What day of the week is this?: Correct  What month is it?: Correct  What year is it?: Correct  Recall \"Ball\": Correct  Recall \"Flag\": Incorrect  Recall \"Tree\": Correct      Functional Ability/Status:   Sid Phillip has a completely normal functional assessment. See flowsheet for details.      Depression Screening (PHQ):  PHQ-2 SCORE: 0  , done 5/22/2025            Advanced Directives:   He does have a Living Will but we do NOT have it on file in Epic.    He does have a POA but we do NOT have it on file in Epic.    Discussed Advance Care Planning with patient (and family/surrogate if present). Standard forms made available to patient in After Visit Summary.      Problem List[1]  Allergies:  He has no known allergies.    Current Medications:  Active Meds, Sig Only[2]    Medical History:  He  has a past medical history of Arthritis, Atrial septal defect (HCC), Hearing loss, PFO (patent foramen ovale) (HCC), Presence of other cardiac implants and grafts, Stented coronary artery, Stroke (HCC), and Wears glasses.  Surgical History:  He  has a past surgical history that includes Cardiac catheterization (02/13/2022); Hip Arthroplasty (Right); shoulder arthroplasty (Right); angioplasty (coronary) (02/2022); and hip replacement surgery (2017).   Family History:  His family history is not on file.  Social History:  He  reports that he has quit smoking. His smoking use included cigarettes. He has a 4 pack-year smoking history. He has never used smokeless tobacco. He  reports current alcohol use of about 4.0 standard drinks of alcohol per week. He reports that he does not currently use drugs.    Tobacco:  He smoked tobacco in the past but quit greater than 12 months ago.  Tobacco Use[3]     CAGE Alcohol Screen:   CAGE screening score of 0 on 5/22/2025, showing low risk of alcohol abuse.      Patient Care Team:  Chucho Shoemaker MD as PCP - General (Internal Medicine)  Sal Mendoza MD (GASTROENTEROLOGY)  Fabiano Guerrero (Internal Medicine)    Review of Systems  A comprehensive 10 point review of systems was completed.     Pertinent positives and negatives noted in the HPI.      Objective:   Physical Exam  General: No acute distress. Alert and oriented x 3.  HEENT: Normocephalic atraumatic. Moist mucous membranes. EOM-I. PERRLA. Anicteric.  Neck: No lymphadenopathy.   Respiratory: Clear to auscultation bilaterally. No wheezes. No rhonchi.  Cardiovascular: S1, S2. Regular rate and rhythm.  Abdomen: Soft,  no pain.  nontender, nondistended.   Extremities: No edema or cyanosis.  Integument: No rashes or lesions.    Psychiatric: Appropriate mood and affect.    /72   Pulse 80   Temp 98.2 °F (36.8 °C)   Resp 16   Ht 6' 2\" (1.88 m)   Wt 189 lb (85.7 kg)   SpO2 98%   BMI 24.27 kg/m²  Estimated body mass index is 24.27 kg/m² as calculated from the following:    Height as of this encounter: 6' 2\" (1.88 m).    Weight as of this encounter: 189 lb (85.7 kg).    Medicare Hearing Assessment:   Hearing Screening    Screening Method: Whisper Test  Whisper Test Result: Pass               Assessment & Plan:   Sid Phillip is a 79 year old male who presents for a Medicare Assessment.     1. Annual physical exam (Primary)  2. Patent foramen ovale (HCC)  3. Mixed hyperlipidemia  4. Atherosclerosis of native coronary artery of native heart with angina pectoris  5. Ectatic aorta  6. Essential hypertension  7. History of stroke  8. Encounter for annual health examination            Aortic ectasia, unspecified site (HCC) -stable. continue control of cad risk factors      Patent foramen ovale-not seen on recent ECHO. monitor     Mixed hyperlipidemia- cont moderate intense dose atorvastatin     Atherosclerotic heart disease of native coronary artery with unspecified angina pectoris (HCC)-s/p anterior wall MI 2/2022 s/p LAD stent- stable. continue control of cad risk factors. Cont plan per cardiology  Cont statin. Recent EF 53%.      Influenza vaccination declined by patient     History of stroke- JESSICA. continue control of cad risk factors     COVID-19 vaccine series declined     We reviewed her at goal labs today  The patient indicates understanding of these issues and agrees to the plan.  Continue with current treatment plan.  Lab work ordered.  Reinforced healthy diet, lifestyle, and exercise.      Return in about 1 year (around 5/22/2026) for AWV.     Chucho Shoemaker MD, 5/21/2025     Supplementary Documentation:   General Health:  In the past six months, have you lost more than 10 pounds without trying?: 2 - No  Has your appetite been poor?: No  Type of Diet: Vegetarian  How does the patient maintain a good energy level?: Appropriate Exercise  How would you describe your daily physical activity?: Heavy  How would you describe your current health state?: Good  How do you maintain positive mental well-being?: Social Interaction  On a scale of 0 to 10, with 0 being no pain and 10 being severe pain, what is your pain level?: 0 - (None)  In the past six months, have you experienced urine leakage?: 0-No  At any time do you feel concerned for the safety/well-being of yourself and/or your children, in your home or elsewhere?: No  Have you had any immunizations at another office such as Influenza, Hepatitis B, Tetanus, or Pneumococcal?: No    Health Maintenance   Topic Date Due    Annual Physical  11/14/2024    Annual Depression Screening  01/01/2025    Fall Risk Screening (Annual)  01/01/2025    Zoster  Vaccines (1 of 2) 06/21/2025 (Originally 7/7/1995)    COVID-19 Vaccine (1 - 2024-25 season) 06/21/2025 (Originally 9/1/2024)    Influenza Vaccine (Season Ended) 10/01/2025    Pneumococcal Vaccine: 50+ Years  Completed    Meningococcal B Vaccine  Aged Out            [1]   Patient Active Problem List  Diagnosis    Patent foramen ovale (HCC)    Mixed hyperlipidemia    Atherosclerotic heart disease of native coronary artery with unspecified angina pectoris (HCC)-s/p anterior wall MI 2/2022 s/p LAD stent    Influenza vaccination declined by patient    History of stroke    COVID-19 vaccine series declined    Essential hypertension    Ectatic aorta   [2]   Outpatient Medications Marked as Taking for the 5/22/25 encounter (Office Visit) with Chucho Shoemaker MD   Medication Sig    Cholecalciferol (VITAMIN D3) 1.25 MG (16452 UT) Oral Cap Take 1 capsule by mouth once a week after 8 weeks are over take OTC Vit D 2,000 units once a day.    sacubitril-valsartan (ENTRESTO) 24-26 MG Oral Tab Take 1 tablet by mouth 2 (two) times daily.    isosorbide mononitrate ER 30 MG Oral Tablet 24 Hr Take 1 tablet (30 mg total) by mouth daily.    carvedilol 3.125 MG Oral Tab Take 1 tablet (3.125 mg total) by mouth Q12H.    atorvastatin 40 MG Oral Tab Take 1 tablet (40 mg total) by mouth daily.    clopidogrel 75 MG Oral Tab Take 1 tablet (75 mg total) by mouth daily.   [3]   Social History  Tobacco Use   Smoking Status Former    Current packs/day: 0.50    Average packs/day: 0.5 packs/day for 8.0 years (4.0 ttl pk-yrs)    Types: Cigarettes   Smokeless Tobacco Never

## 2025-08-25 ENCOUNTER — PATIENT MESSAGE (OUTPATIENT)
Dept: INTERNAL MEDICINE CLINIC | Facility: CLINIC | Age: 80
End: 2025-08-25